# Patient Record
Sex: MALE | Race: WHITE | Employment: FULL TIME | URBAN - METROPOLITAN AREA
[De-identification: names, ages, dates, MRNs, and addresses within clinical notes are randomized per-mention and may not be internally consistent; named-entity substitution may affect disease eponyms.]

---

## 2023-11-28 ENCOUNTER — HOSPITAL ENCOUNTER (OUTPATIENT)
Dept: CT IMAGING | Facility: HOSPITAL | Age: 43
Discharge: HOME | End: 2023-11-28
Attending: INTERNAL MEDICINE

## 2023-11-28 DIAGNOSIS — Z13.6 SCREENING FOR CARDIOVASCULAR CONDITION: ICD-10-CM

## 2023-11-29 NOTE — PROGRESS NOTES
Date of Service 11/28/2023    Jean-Paul Nicholson  Date of Birth 5/14/1980    Patient Age: 41year old    PCP: MD [id number]    Heart Scan Consult  Preliminary Heart Scan Score: 0.85    Previous Screening  Heart Scan Completed Previously: No        Peripheral Vascular Scan Completed Previously: No          Risk Factors  Personal Risk Factors  Non-alterable Risk Factors: Personal History;Age;Gender;Family History  Alterable Risk Factors: Abnormal Cholesterol;Unhealthy eating          Blood Pressure  There were no vitals taken for this visit. (Normal =< 120/80,  Elevated = 120-129/ >80,  High Stage1 130-139/80-89 , Stage2 >140/>90)    Lipid Profile  LDL Cholesterol: 130, done on 10/31/2014. No Lipid results on file in last 5 years . Cholesterol Goals  Value   Total  =< 200   HDL  = > 45 Men = > 55 Women   LDL   =< 100   Triglycerides  =< 150       Glucose and Hemoglobin A1C  No results found for: \"GLU\", \"A1C\"  (Normal Fasting Glucose < 100mg/dl )    Nurse Review  Risk factor information and results reviewed with Nurse: Yes    Recommended Follow Up:  Consult your physician regarding[de-identified]   Final Heart Scan Report; Discuss potential for Incidental Finding; Cholesterol Results (Fasting)      Recommendations for Change:  Nutrition Changes: Low Saturated Fat;Low Fat Dairy; Increase Fiber    Cholesterol Modification (goal of therapy depends upon your risk):   Decrease LDL (Lousy/Bad) Ideal <100     (Today's NON-FASTING Cholestech Values: Total Cholesterol-205, HDL-49, LDL-135, Triglycerides-105, Glucose-111)    Exercise: Enhance Current Program                   Repeat Heart Scan:   3 Years if Calcium Score is > 0.0;  5 years if Calcium Score is 0.0; Discuss with your Physician              [place] Recommended Resources:  Recommended Resources: Upcoming Classes, Medical Services and Health Library [email or web address];    PV Screening  Recommended PV Screening: Carotids; Abdomen; Ankle-Brachial Index (DG)         Nathan Hatch RN        Please Contact the Nurse Heart Line with any Questions or Concerns [phone].